# Patient Record
Sex: MALE | URBAN - METROPOLITAN AREA
[De-identification: names, ages, dates, MRNs, and addresses within clinical notes are randomized per-mention and may not be internally consistent; named-entity substitution may affect disease eponyms.]

---

## 2021-01-11 ENCOUNTER — IMPORTED ENCOUNTER (OUTPATIENT)
Dept: URBAN - METROPOLITAN AREA CLINIC 9 | Facility: CLINIC | Age: 66
End: 2021-01-11

## 2021-01-11 PROBLEM — H25.13: Noted: 2021-01-11

## 2021-02-25 NOTE — PATIENT DISCUSSION
CATARACTS, OU: VISUALLY SIGNIFICANT. OPTION OF SURGERY VERSUS FOLLOWING VERSUS UPDATING GLASSES DISCUSSED. RBA'S DISCUSSED, PATIENT UNDERSTANDS AND DESIRES SURGERY TO INCREASE VISION FOR DRIVING AND TO REDUCE GLARE. SCHEDULE CATARACT SURGERY/PRE-OP OU AFTER CLEARANCE FROM DR Abilio Rodrigues.

## 2021-02-25 NOTE — PATIENT DISCUSSION
AMD (DRY), OU:  MAC OCT DONE TODAY TO DOCUMENT. PRESCRIBE AREDS 2 VITAMINS AND UV PROTECTION TO SLOW PROGRESSION. SMOKING CESSATION EMPHASIZED. PRESCRIBE REGULAR AMSLER GRID CHECKS TO SELF MONITOR.   REF PATIENT TO DR Michelle Walker FOR FURTHER EVAL AND CATARACT CLEARANCE Awake

## 2021-10-16 ASSESSMENT — TONOMETRY
OS_IOP_MMHG: 14
OD_IOP_MMHG: 14

## 2021-10-16 ASSESSMENT — VISUAL ACUITY
OD_CC: 20/20 - SN
OD_SC: 20/25 SN
OS_CC: 20/25 -2 SN
OS_CC: 20/20 SN
OS_SC: 20/40 +2 SN

## 2021-11-11 NOTE — PATIENT DISCUSSION
Differential diagnosis includes age-related macular degeneration, pattern macular dystrophy, Stargardt dystrophy, cone-landry dystrophy.  Discussed lack of treatment options at this time.  Offered retinal genetic testing.  Patient elected to defer genetic testing for now, would like to think about it.

## 2021-11-11 NOTE — PATIENT DISCUSSION
Recommended patient see Dr. Sandra Thakkar for formal refraction, glaucoma screening tests, and cataract evaluation.

## 2021-12-17 NOTE — PATIENT DISCUSSION
Recommend biopsy of lesion on right conjunctiva. Discussed the risks and benefits of procedure, patient expressed understanding and wishes to proceed today.

## 2021-12-17 NOTE — PROCEDURE NOTE: CLINICAL
PROCEDURE NOTE: Biopsy of Conjunctiva OD. Anesthesia: Local. Prep: Betadine Drops and Scrubs. Biopsy Lesion Conjunctiva: The patient had a lesion on the right conjunctiva. After informed consent the patient received a local anesthetic injection of 1% lidocaine with epinephrine 1:100,000 units. A section of the mass was excised with Justina scissors and sent to the pathology laboratory for evaluation. The patient was given written post operative care instructions and a prescription for antibiotic ointment. The patient was asked to call our office within 10 days for follow up if the patient had not heard from our office regarding the result of the biopsy before that time. René Tang

## 2022-06-30 RX ORDER — LISINOPRIL AND HYDROCHLOROTHIAZIDE 20; 12.5 MG/1; MG/1
TABLET ORAL
COMMUNITY
End: 2022-10-06 | Stop reason: SDUPTHER

## 2022-06-30 RX ORDER — FLUTICASONE PROPIONATE 50 MCG
SPRAY, SUSPENSION (ML) NASAL
COMMUNITY
End: 2022-09-14 | Stop reason: ALTCHOICE

## 2022-06-30 RX ORDER — TADALAFIL 5 MG/1
TABLET ORAL
COMMUNITY
Start: 2021-11-17 | End: 2022-10-06

## 2022-06-30 RX ORDER — ATORVASTATIN CALCIUM 20 MG/1
TABLET, FILM COATED ORAL
COMMUNITY
Start: 2021-11-17 | End: 2022-10-06 | Stop reason: SDUPTHER

## 2022-07-06 ENCOUNTER — ESTABLISHED PATIENT (OUTPATIENT)
Dept: URBAN - METROPOLITAN AREA CLINIC 17 | Facility: CLINIC | Age: 67
End: 2022-07-06

## 2022-07-06 DIAGNOSIS — H35.3121: ICD-10-CM

## 2022-07-06 DIAGNOSIS — H25.13: ICD-10-CM

## 2022-07-06 DIAGNOSIS — E11.9: ICD-10-CM

## 2022-07-06 PROCEDURE — 92015 DETERMINE REFRACTIVE STATE: CPT

## 2022-07-06 PROCEDURE — 92014 COMPRE OPH EXAM EST PT 1/>: CPT

## 2022-07-06 PROCEDURE — 92134 CPTRZ OPH DX IMG PST SGM RTA: CPT

## 2022-07-06 ASSESSMENT — TONOMETRY
OD_IOP_MMHG: 15
OS_IOP_MMHG: 17

## 2022-07-06 ASSESSMENT — VISUAL ACUITY
OD_GLARE: 20/30
OS_SC: 20/30-2
OD_SC: 20/25-2
OS_GLARE: 20/30

## 2022-10-06 PROBLEM — R73.09 ELEVATED HEMOGLOBIN A1C: Status: ACTIVE | Noted: 2022-10-06

## 2022-10-06 PROBLEM — G47.33 OSA (OBSTRUCTIVE SLEEP APNEA): Status: ACTIVE | Noted: 2022-10-06

## 2022-10-06 PROBLEM — N40.0 BENIGN PROSTATIC HYPERPLASIA: Status: ACTIVE | Noted: 2022-10-06

## 2022-10-06 PROBLEM — N52.9 ERECTILE DYSFUNCTION: Status: ACTIVE | Noted: 2022-10-06

## 2022-10-06 PROBLEM — M54.9 BACK PAIN: Status: ACTIVE | Noted: 2022-10-06

## 2022-10-06 PROBLEM — E11.9 CONTROLLED TYPE 2 DIABETES MELLITUS WITHOUT COMPLICATION, WITHOUT LONG-TERM CURRENT USE OF INSULIN (HCC): Status: ACTIVE | Noted: 2022-10-06

## 2022-10-06 PROBLEM — E78.5 HYPERLIPIDEMIA: Status: ACTIVE | Noted: 2022-10-06

## 2022-10-06 PROBLEM — G47.9 SLEEP DISTURBANCE: Status: ACTIVE | Noted: 2022-10-06

## 2022-10-06 PROBLEM — I10 HYPERTENSION: Status: ACTIVE | Noted: 2022-10-06

## 2022-10-06 PROBLEM — M47.816 LUMBAR SPONDYLOSIS: Status: ACTIVE | Noted: 2022-10-06

## 2022-10-06 PROBLEM — I45.10 RBBB: Status: ACTIVE | Noted: 2022-10-06
